# Patient Record
(demographics unavailable — no encounter records)

---

## 2024-10-21 NOTE — ASSESSMENT
[FreeTextEntry1] : 18 year old male presented for an annual physical exam.  routine blood work today, blood collected in the office. up to date with screening flu vaccine today. Will request for medical record from pediatrician.  will call back with results.

## 2024-10-21 NOTE — ADDENDUM
[FreeTextEntry1] : Patient present for influenza vaccination as ordered by  Prior to administration, reviewed influenza VIS with patient who verbalized understanding and consent.   Pt denies previous allergic reactions and Guillain [place] syndrome.   MANU: [employer] [id number] LOT: [id number] EXP:06/01/2025  Patient tolerated vaccination well in left deltoid. No immediate adverse reaction noted.   VIS sent home with patient as per protocol.   Susana Rodriguez RN

## 2024-10-21 NOTE — HEALTH RISK ASSESSMENT
[Excellent] : ~his/her~ current health as excellent [Very Good] : ~his/her~  mood as very good [No falls in past year] : Patient reported no falls in the past year [0] : 2) Feeling down, depressed, or hopeless: Not at all (0) [PHQ-2 Negative - No further assessment needed] : PHQ-2 Negative - No further assessment needed [Employed] : employed [Student] : student [High School] : high school [Single] : single [Never] : Never [No] : In the past 12 months have you used drugs other than those required for medical reasons? No [HIV test declined] : HIV test declined [Hepatitis C test declined] : Hepatitis C test declined [None] : None [With Family] : lives with family [Fully functional (bathing, dressing, toileting, transferring, walking, feeding)] : Fully functional (bathing, dressing, toileting, transferring, walking, feeding) [Fully functional (using the telephone, shopping, preparing meals, housekeeping, doing laundry, using] : Fully functional and needs no help or supervision to perform IADLs (using the telephone, shopping, preparing meals, housekeeping, doing laundry, using transportation, managing medications and managing finances) [Reports normal functional visual acuity (ie: able to read med bottle)] : Reports normal functional visual acuity [Audit-CScore] : 0 [de-identified] : runs [de-identified] : good [BQO1Eabre] : 0 [Reports changes in hearing] : Reports no changes in hearing [Reports changes in vision] : Reports no changes in vision [Reports changes in dental health] : Reports no changes in dental health [de-identified] : emmy  [FreeTextEntry2] : [place]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

## 2024-10-21 NOTE — HISTORY OF PRESENT ILLNESS
[FreeTextEntry1] : Annual and establish care  [de-identified] : 18-year-old male with no significant past medical history presenting for an annual physical exam.  He is doing well and has no concerns today.  He is a sophomore in college.  He was following with pediatrician at [facility].